# Patient Record
(demographics unavailable — no encounter records)

---

## 2025-06-13 NOTE — DATA REVIEWED
[de-identified] : Pelvis radiographs were obtained in [facility] on 5/31/2025 and reviewed today: the left femoral head appears smaller and slightly flattened with irregular surface contour compared with the normal-appearing right femoral epiphysis. Both hips are well located within the acetabula. They remain well-reduced on frog-leg views. Appropriate bilateral acetabular angles are seen. No acute fracture or dislocation is seen. Pelvic ring appears otherwise intact.  AP/frog-leg lateral radiographs of bilateral hips were obtained and independently reviewed during today's visit.  Persistent flattening of the left femoral head noted.  Bilateral femoral heads are well-seated within the acetabuli.  Bilateral Shenton's line is intact.  Bilateral acetabular indices remain normal for age.

## 2025-06-13 NOTE — HISTORY OF PRESENT ILLNESS
[FreeTextEntry1] : Shilpa is a 2-year-old male with left hip pain. Per report on 5/26/25 he developed left hip pain. The following day he was walking with a limp. On 5/28/25 he was unable to bear weight he was seen by his pediatrician who recommended obtaining imaging.  Pelvis radiographs were obtained on 5/31/2025 and based on the findings it was recommended they follow-up with pediatric orthopedics.  Today, his mother reports he is overall doing well.  She reports he is not complaining of pain about the hip any longer.  He is back to his baseline gait.  She reports at baseline he has poor balance and walks on his toes.  She denies any recent fevers or chills.  She presents today for initial evaluation of his left hip pain.

## 2025-06-13 NOTE — REASON FOR VISIT
[Initial Evaluation] : an initial evaluation [Patient] : patient [Mother] : mother [Family Member] : family member [FreeTextEntry1] : Left hip pain

## 2025-06-13 NOTE — REVIEW OF SYSTEMS
[Change in Activity] : no change in activity [Redness] : no redness [Sore Throat] : no sore throat [Vomiting] : no vomiting [Bladder Infection] : no bladder infection [Joint Swelling] : no joint swelling

## 2025-06-13 NOTE — ASSESSMENT
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  -We discussed the history, physical exam, and all available radiographs at length during today's visit with patient and his parent/guardian who served as an independent historian due to child's age and unreliable nature of history. - Pelvis radiographs were obtained in [facility]o imaging on 5/31/2025 and reviewed today: the left femoral head appears smaller and slightly flattened with irregular surface contour compared with the normal-appearing right femoral epiphysis. Both hips are well located within the acetabula. They remain well-reduced on frog-leg views. Appropriate bilateral acetabular angles are seen. No acute fracture or dislocation is seen. Pelvic ring appears otherwise intact. - AP/frog-leg lateral radiographs of bilateral hips were obtained and independently reviewed during today's visit.  Persistent flattening of the left femoral head noted.  Bilateral femoral heads are well-seated within the acetabuli.  Bilateral Shenton's line is intact.  Bilateral acetabular indices remain normal for age. -The etiology, pathoanatomy, treatment modalities, and expected natural history of the above were discussed at length today. -Clinically, he has no point tenderness on examination today.  He has noted to have asymmetric rotation of the hips as well as poor coordination and balance with ambulation. 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 - Recommendation at this time is to complete a course of PT for his toe walking as well as his Perthes. A prescription was provided today. - He may weight-bear as tolerated on the bilateral lower extremities. -OTC NSAIDs as needed - He should refrain from high-impact activities such as trampolines and bouncy houses. - Healthy weight maintenance was also discussed today. -We will plan to see him back in clinic in approximately 4 weeks for reevaluation and new pelvis radiographs.   All questions and concerns were addressed today. Parent and patient verbalize understanding and agree with plan of care.   I, Michelle Paulson, have acted as a scribe and documented the above information for Dr. Spann.   This note was created using Dragon Voice Recognition Software and may have been partially created using [employer] software which was then reviewed and edited to the best of my ability. Sporadic inaccurate translation may have occurred. If there are any questions about content of the note, please contact the office for clarification.

## 2025-07-25 NOTE — DATA REVIEWED
[de-identified] : Pelvis radiographs were obtained in [facility] on 5/31/2025 and reviewed today: the left femoral head appears smaller and slightly flattened with irregular surface contour compared with the normal-appearing right femoral epiphysis. Both hips are well located within the acetabula. They remain well-reduced on frog-leg views. Appropriate bilateral acetabular angles are seen. No acute fracture or dislocation is seen. Pelvic ring appears otherwise intact.  AP/frog-leg lateral radiographs of bilateral hips were obtained and independently reviewed during today's visit.  Persistent flattening of the left femoral head noted.  Bilateral femoral heads are well-seated within the acetabuli.  Bilateral Shenton's line is intact.  Bilateral acetabular indices remain normal for age.

## 2025-07-25 NOTE — PHYSICAL EXAM
[FreeTextEntry1] : GENERAL: alert, cooperative, in NAD SKIN: The skin is intact, warm, pink and dry over the area examined. EYES: Normal conjunctiva, normal eyelids and pupils were equal and round. ENT: normal ears, normal nose and normal lips. CARDIOVASCULAR: brisk capillary refill, but no peripheral edema. RESPIRATORY: The patient is in no apparent respiratory distress. They're taking full deep breaths without use of accessory muscles or evidence of audible wheezes or stridor without the use of a stethoscope. Normal respiratory effort. ABDOMEN: not examined.   Bilateral lower extremities: Wide symmetric abduction of bilateral hips to greater than 60 degrees. Prone internal rotation to 45 degrees on the right and 20/25 degrees on the left Prone external rotation to 90 degrees on the right and 70 degrees on the left Thigh foot angle is -10 degrees bilaterally. Bilateral knees with full ROM. No ligamentous laxity in either knee. Negative Galeazzi. Bilateral ankle dorsiflexion to 5 Degrees. Patient has flat feet with standing.  The arches collapse and heals tip into valgus.  The arches form when sitting and on toe dorsiflexion.  Subtalar motion is full and free.  Gait: Patient is seen bearing full weight across the bilateral lower extremities with an intermittent toe walking gait.  He is able to walk with a heel-toe gait.  Poor coordination is noted.

## 2025-07-25 NOTE — ASSESSMENT
[FreeTextEntry1] : 2-year-old male with left hip pain, asymmetric femoral heads concerning for possible early Perthes disease. Intermittent toe walking.  -We discussed the history, physical exam, and all available radiographs at length during today's visit with patient and his parent/guardian who served as an independent historian due to child's age and unreliable nature of history. -AP/frog-leg lateral radiographs of bilateral hips were obtained and independently reviewed during today's visit.  Persistent flattening of the left femoral head noted.  Bilateral femoral heads are well-seated within the acetabuli.  Bilateral Shenton's line is intact.  Bilateral acetabular indices remain normal for age. -The etiology, pathoanatomy, treatment modalities, and expected natural history of the above were discussed at length today. -Clinically, he has no point tenderness on examination today.  He has noted to have slight asymmetric rotation of the hips as well as poor coordination and balance with ambulation. -Based on radiographs reviewed today and his clinical examination, we discussed our concerns about the asymmetric nature of the femoral heads and asymmetry in hip ROM. While definitive diagnosis is unclear at this time, there is high suspicion for early Perthes disease. The etiology, pathoanatomy, treatment modalities, and expected natural history of the condition were discussed at length today. - My recommendation would be to obtain a sedated MRI of the left hip for further evaluation, however family deferring sedated MRI at this time.  - We will continue with close observation.  - Recommendation at this time is to complete a course of PT for his toe walking as well as hip ROM. A prescription was again provided today. -He may weight-bear as tolerated on the bilateral lower extremities. -He should refrain from high-impact activities such as trampolines, bouncy houses, etc. No running or jumping. -OTC NSAIDs as needed -Healthy weight maintenance was also discussed today -We will plan to see him back in clinic in approximately 2 months for reevaluation and new pelvis radiographs.  He will return to the office or the emergency department at any time with any acute worsening changes in his symptomatology.  Prognosis of his condition is uncertain at this time.  All questions and concerns were addressed today. Family verbalizes understanding and agree with plan of care.   I, Natalia Serrano PA-C, have acted as a scribe and documented the above information for Dr. Spann.

## 2025-07-25 NOTE — REASON FOR VISIT
[Follow Up] : a follow up visit [FreeTextEntry1] : Left hip pain, limping [Patient] : patient [Parents] : parents

## 2025-07-25 NOTE — HISTORY OF PRESENT ILLNESS
[FreeTextEntry1] : Shilpa is a 2-year-old male with left hip pain. Per report on 5/26/25 he developed left hip pain. The following day he was walking with a limp. On 5/28/25 he was unable to bear weight he was seen by his pediatrician who recommended obtaining imaging.  Pelvis radiographs were obtained on 5/31/2025 and based on the findings it was recommended they follow-up with pediatric orthopedics. At initial office visit on June 13, 2025 there was concern for flattening of the left femoral head, concerning for early Perthes Disease, avoiding high impact activities and initiating physical therapy was recommended.   Today, his mother reports he is overall doing well.  She reports he is not complaining of pain about the hip any longer.  He is back to his baseline gait.  She reports at baseline he has poor balance and walks on his toes.  She denies any recent fevers or chills. He was unable to begin physical therapy due to insurance concerns. He has been compliant with activity restrictions. He presents today for repeat radiographs and further orthopedic evaluation.

## 2025-07-25 NOTE — REVIEW OF SYSTEMS
[Change in Activity] : change in activity [Fever Above 102] : no fever [Malaise] : no malaise [Rash] : no rash [Redness] : no redness [Nasal Stuffiness] : no nasal congestion [Congestion] : no congestion [Vomiting] : no vomiting [Diarrhea] : no diarrhea [Constipation] : no constipation [Bladder Infection] : no bladder infection [Joint Swelling] : no joint swelling [Sleep Disturbances] : ~T no sleep disturbances